# Patient Record
Sex: MALE | Race: WHITE | URBAN - METROPOLITAN AREA
[De-identification: names, ages, dates, MRNs, and addresses within clinical notes are randomized per-mention and may not be internally consistent; named-entity substitution may affect disease eponyms.]

---

## 2022-09-27 ENCOUNTER — TELEPHONE (OUTPATIENT)
Dept: FAMILY MEDICINE | Facility: CLINIC | Age: 42
End: 2022-09-27

## 2022-09-27 NOTE — TELEPHONE ENCOUNTER
Spoke to Cecy, patient has not been seen at  since 2012 and per wife, has not seen any doctor since then. Clinic appts are out too far for him to wait, he will go to Urgent Care and follow up with provider to establish care.    Ángela HUBER  [facility]   [facility]

## 2022-09-27 NOTE — TELEPHONE ENCOUNTER
"  Reason for Call:  Same Day Appointment, Requested Provider:  Anyone at [facility] (I did check other locations)    PCP: Juan Miguel Marte    Reason for visit: back pain    Duration of symptoms: \"a while\"    Have you been treated for this in the past? No    Additional comments:     Can we leave a detailed message on this number? NO    Phone number patient can be reached at: [phone]    Best Time:     Call taken on 9/27/2022 at 7:40 AM by Caitlin Singh      "